# Patient Record
Sex: FEMALE | HISPANIC OR LATINO | URBAN - METROPOLITAN AREA
[De-identification: names, ages, dates, MRNs, and addresses within clinical notes are randomized per-mention and may not be internally consistent; named-entity substitution may affect disease eponyms.]

---

## 2018-04-16 ENCOUNTER — NEW PATIENT (OUTPATIENT)
Dept: URBAN - METROPOLITAN AREA CLINIC 56 | Facility: CLINIC | Age: 78
End: 2018-04-16
Payer: MEDICARE

## 2018-04-16 DIAGNOSIS — E10.39 TYPE 1 DIABETES MELLITUS WITH OPHTHALMIC COMPLICAT: Primary | ICD-10-CM

## 2018-04-16 DIAGNOSIS — E10.3313 DIABETES MELLITUS TYPE 1 WITH MODERATE NON-PROLIFE: ICD-10-CM

## 2018-04-16 DIAGNOSIS — H04.123 TEAR FILM INSUFFICIENCY OF BILATERAL LACRIMAL GLANDS: ICD-10-CM

## 2018-04-16 DIAGNOSIS — Z96.1 PRESENCE OF INTRAOCULAR LENS: ICD-10-CM

## 2018-04-16 DIAGNOSIS — H43.813 VITREOUS DEGENERATION, BILATERAL: ICD-10-CM

## 2018-04-16 DIAGNOSIS — H02.834 DERMATOCHALASIS OF LEFT UPPER LID: ICD-10-CM

## 2018-04-16 DIAGNOSIS — H25.811 COMBINED FORMS OF AGE-RELATED CATARACT, RIGHT EYE: ICD-10-CM

## 2018-04-16 DIAGNOSIS — H02.831 DERMATOCHALASIS OF RIGHT UPPER LID: ICD-10-CM

## 2018-04-16 PROCEDURE — 92004 COMPRE OPH EXAM NEW PT 1/>: CPT | Performed by: OPTOMETRIST

## 2018-04-16 ASSESSMENT — INTRAOCULAR PRESSURE
OD: 16
OS: 20

## 2018-04-16 ASSESSMENT — KERATOMETRY
OS: 45.16
OD: 44.98

## 2018-04-16 ASSESSMENT — VISUAL ACUITY
OS: 20/30
OD: 20/30

## 2022-08-03 ENCOUNTER — OFFICE VISIT (OUTPATIENT)
Facility: LOCATION | Age: 82
End: 2022-08-03
Payer: OTHER MISCELLANEOUS

## 2022-08-03 DIAGNOSIS — H43.822 VITREOMACULAR ADHESION, LEFT EYE: ICD-10-CM

## 2022-08-03 DIAGNOSIS — H25.811 COMBINED FORMS OF AGE-RELATED CATARACT, RIGHT EYE: ICD-10-CM

## 2022-08-03 DIAGNOSIS — E11.3393 TYPE 2 DIAB W MODERATE NONPRLF DIAB RTNOP W/O MACULAR EDEMA, BILATERAL: Primary | ICD-10-CM

## 2022-08-03 PROCEDURE — 92004 COMPRE OPH EXAM NEW PT 1/>: CPT | Performed by: OPTOMETRIST

## 2022-08-03 PROCEDURE — 92134 CPTRZ OPH DX IMG PST SGM RTA: CPT | Performed by: OPTOMETRIST

## 2022-08-03 ASSESSMENT — VISUAL ACUITY
OD: 20/40
OS: 20/30

## 2022-08-03 ASSESSMENT — INTRAOCULAR PRESSURE
OS: 16
OD: 16

## 2022-08-03 ASSESSMENT — KERATOMETRY
OD: 45.25
OS: 45.50

## 2022-08-03 NOTE — IMPRESSION/PLAN
Impression: Type 2 diab w moderate nonprlf diab rtnop w/o macular edema, bilateral: H48.5122. Plan: Patient advised of moderate diabetic retinopathy present OU. No signs of neovascularization or macular edema noted. Patient advised of the importance of good BG control and follow ups with PCP. Will continue to monitor.

## 2022-08-03 NOTE — IMPRESSION/PLAN
Impression: Vitreomacular adhesion, left eye: H43.822. Plan: Noted on exam w/ mild SRF. Patient denies changes in vision. Discussed close monitoring vs retina eval for PPV w/ ILM. Patient elects to monitor closely. Recommend DE/OCTM following cataract surgery.

## 2022-08-03 NOTE — IMPRESSION/PLAN
Impression: Combined forms of age-related cataract, right eye: H25.811. Gonio: OD: Difficult due to patient cooperation- bottom of TM in all quadrants, phacomorphic changes, dilated  Plan: Cataracts account for the patient's complaints. Discussed all risks, benefits, procedures and recovery. Patient understands changing glasses will not improve vision. Patient desires to have surgery, recommend phacoemulsification with intraocular lens.